# Patient Record
Sex: FEMALE | Race: WHITE
[De-identification: names, ages, dates, MRNs, and addresses within clinical notes are randomized per-mention and may not be internally consistent; named-entity substitution may affect disease eponyms.]

---

## 2018-03-27 ENCOUNTER — RECORD ABSTRACTING (OUTPATIENT)
Age: 48
End: 2018-03-27

## 2018-03-27 DIAGNOSIS — F15.90 OTHER STIMULANT USE, UNSPECIFIED, UNCOMPLICATED: ICD-10-CM

## 2018-03-27 DIAGNOSIS — Z78.9 OTHER SPECIFIED HEALTH STATUS: ICD-10-CM

## 2018-03-27 PROBLEM — Z00.00 ENCOUNTER FOR PREVENTIVE HEALTH EXAMINATION: Status: ACTIVE | Noted: 2018-03-27

## 2018-03-27 RX ORDER — LEVONORGESTREL AND ETHINYL ESTRADIOL 6-5-10
KIT ORAL
Refills: 0 | Status: ACTIVE | COMMUNITY

## 2018-03-30 ENCOUNTER — APPOINTMENT (OUTPATIENT)
Dept: HEART AND VASCULAR | Facility: CLINIC | Age: 48
End: 2018-03-30
Payer: COMMERCIAL

## 2018-03-30 VITALS
HEART RATE: 64 BPM | HEIGHT: 63 IN | WEIGHT: 124 LBS | BODY MASS INDEX: 21.97 KG/M2 | RESPIRATION RATE: 16 BRPM | SYSTOLIC BLOOD PRESSURE: 120 MMHG | DIASTOLIC BLOOD PRESSURE: 80 MMHG

## 2018-03-30 DIAGNOSIS — R07.9 CHEST PAIN, UNSPECIFIED: ICD-10-CM

## 2018-03-30 DIAGNOSIS — Z87.898 PERSONAL HISTORY OF OTHER SPECIFIED CONDITIONS: ICD-10-CM

## 2018-03-30 DIAGNOSIS — I49.8 OTHER SPECIFIED CARDIAC ARRHYTHMIAS: ICD-10-CM

## 2018-03-30 PROCEDURE — 99204 OFFICE O/P NEW MOD 45 MIN: CPT

## 2021-01-26 ENCOUNTER — LABORATORY RESULT (OUTPATIENT)
Age: 51
End: 2021-01-26

## 2021-01-26 ENCOUNTER — APPOINTMENT (OUTPATIENT)
Dept: HEART AND VASCULAR | Facility: CLINIC | Age: 51
End: 2021-01-26
Payer: COMMERCIAL

## 2021-01-26 ENCOUNTER — NON-APPOINTMENT (OUTPATIENT)
Age: 51
End: 2021-01-26

## 2021-01-26 VITALS
OXYGEN SATURATION: 99 % | WEIGHT: 141 LBS | SYSTOLIC BLOOD PRESSURE: 118 MMHG | TEMPERATURE: 97.2 F | RESPIRATION RATE: 16 BRPM | HEART RATE: 56 BPM | HEIGHT: 63 IN | DIASTOLIC BLOOD PRESSURE: 68 MMHG | BODY MASS INDEX: 24.98 KG/M2

## 2021-01-26 PROCEDURE — 93246 EXT ECG>7D<15D RECORDING: CPT

## 2021-01-26 PROCEDURE — 93248 EXT ECG>7D<15D REV&INTERPJ: CPT

## 2021-01-26 PROCEDURE — 99072 ADDL SUPL MATRL&STAF TM PHE: CPT

## 2021-01-26 PROCEDURE — 93000 ELECTROCARDIOGRAM COMPLETE: CPT | Mod: 59

## 2021-01-26 PROCEDURE — 36415 COLL VENOUS BLD VENIPUNCTURE: CPT

## 2021-01-26 RX ORDER — CHROMIUM 200 MCG
TABLET ORAL
Refills: 0 | Status: ACTIVE | COMMUNITY

## 2021-01-26 RX ORDER — COLD-HOT PACK
EACH MISCELLANEOUS
Refills: 0 | Status: ACTIVE | COMMUNITY

## 2021-01-27 LAB
ALBUMIN SERPL ELPH-MCNC: 4.4 G/DL
ALP BLD-CCNC: 57 U/L
ALT SERPL-CCNC: 14 U/L
ANION GAP SERPL CALC-SCNC: 13 MMOL/L
AST SERPL-CCNC: 18 U/L
BASOPHILS # BLD AUTO: 0.08 K/UL
BASOPHILS NFR BLD AUTO: 1.6 %
BILIRUB SERPL-MCNC: 0.3 MG/DL
BUN SERPL-MCNC: 15 MG/DL
CALCIUM SERPL-MCNC: 9.3 MG/DL
CHLORIDE SERPL-SCNC: 106 MMOL/L
CHOLEST SERPL-MCNC: 193 MG/DL
CO2 SERPL-SCNC: 21 MMOL/L
CREAT SERPL-MCNC: 0.66 MG/DL
CRP SERPL HS-MCNC: 3.2 MG/L
EOSINOPHIL # BLD AUTO: 0.1 K/UL
EOSINOPHIL NFR BLD AUTO: 1.9 %
GLUCOSE SERPL-MCNC: 83 MG/DL
HCT VFR BLD CALC: 41.4 %
HDLC SERPL-MCNC: 67 MG/DL
HGB BLD-MCNC: 13 G/DL
IMM GRANULOCYTES NFR BLD AUTO: 0.2 %
LDLC SERPL CALC-MCNC: 102 MG/DL
LYMPHOCYTES # BLD AUTO: 1.55 K/UL
LYMPHOCYTES NFR BLD AUTO: 30.2 %
MAGNESIUM SERPL-MCNC: 2.2 MG/DL
MAN DIFF?: NORMAL
MCHC RBC-ENTMCNC: 31.4 GM/DL
MCHC RBC-ENTMCNC: 32.4 PG
MCV RBC AUTO: 103.2 FL
MONOCYTES # BLD AUTO: 0.31 K/UL
MONOCYTES NFR BLD AUTO: 6 %
NEUTROPHILS # BLD AUTO: 3.09 K/UL
NEUTROPHILS NFR BLD AUTO: 60.1 %
NONHDLC SERPL-MCNC: 126 MG/DL
PHOSPHATE SERPL-MCNC: 2.6 MG/DL
PLATELET # BLD AUTO: 236 K/UL
POTASSIUM SERPL-SCNC: 4.5 MMOL/L
PROT SERPL-MCNC: 6.7 G/DL
RBC # BLD: 4.01 M/UL
RBC # FLD: 13 %
SODIUM SERPL-SCNC: 140 MMOL/L
T3FREE SERPL-MCNC: 2.68 PG/ML
T3RU NFR SERPL: 1.3 TBI
T4 SERPL-MCNC: 8.6 UG/DL
TRIGL SERPL-MCNC: 115 MG/DL
TSH SERPL-ACNC: 2.69 UIU/ML
WBC # FLD AUTO: 5.14 K/UL

## 2021-01-28 ENCOUNTER — NON-APPOINTMENT (OUTPATIENT)
Age: 51
End: 2021-01-28

## 2021-01-28 ENCOUNTER — APPOINTMENT (OUTPATIENT)
Dept: HEART AND VASCULAR | Facility: CLINIC | Age: 51
End: 2021-01-28
Payer: COMMERCIAL

## 2021-01-28 PROCEDURE — 99072 ADDL SUPL MATRL&STAF TM PHE: CPT

## 2021-01-28 PROCEDURE — 93306 TTE W/DOPPLER COMPLETE: CPT

## 2021-02-11 ENCOUNTER — NON-APPOINTMENT (OUTPATIENT)
Age: 51
End: 2021-02-11

## 2021-02-18 ENCOUNTER — APPOINTMENT (OUTPATIENT)
Dept: HEART AND VASCULAR | Facility: CLINIC | Age: 51
End: 2021-02-18
Payer: COMMERCIAL

## 2021-02-18 ENCOUNTER — NON-APPOINTMENT (OUTPATIENT)
Age: 51
End: 2021-02-18

## 2021-02-18 DIAGNOSIS — I49.1 ATRIAL PREMATURE DEPOLARIZATION: ICD-10-CM

## 2021-02-18 DIAGNOSIS — I49.3 VENTRICULAR PREMATURE DEPOLARIZATION: ICD-10-CM

## 2021-02-18 DIAGNOSIS — R94.39 ABNORMAL RESULT OF OTHER CARDIOVASCULAR FUNCTION STUDY: ICD-10-CM

## 2021-02-18 DIAGNOSIS — R00.2 PALPITATIONS: ICD-10-CM

## 2021-02-18 DIAGNOSIS — I47.1 SUPRAVENTRICULAR TACHYCARDIA: ICD-10-CM

## 2021-02-18 PROCEDURE — 99215 OFFICE O/P EST HI 40 MIN: CPT | Mod: 95

## 2021-02-18 NOTE — HISTORY OF PRESENT ILLNESS
[FreeTextEntry1] : Laura Townsend requests a televideo follow up.  Consent obtained and recorded.  She is at her home and doctor is in [place], [place].  \par \par She was seen by me in March 2018.\par \par In late January 2021, she reports experiencing palpitations intermittently throughout the day.  The symptoms occurred primarily at rest, lasting a few seconds at a time, described as "thumping" or "racy" in the chest.  She denies any associated cp, sob, lightheadedness.  She denies pnd, orthopnea, edema, or loc. She notes no change in her exercise capacity and endurance.\par \par She is active and compliant with supplements that she has been taking for some time.\par \par ECG 1/2021: sinus bradycardia, short NC\par TTE 1/2021: nl lv sys fxn; nl michelle fxn\par ZIO 1/2021: APC/PVC/SVT - correlates with her symptoms\par Blood 1/2021: slightly elevated CRP\par \par EXSE 3/2018: nl lv sys fxn; nl michelle fxn; 12:49 min [place]; pos ECG; no ischemia by WM\par \par Reviewed clinical hx and results in detail.  \par \par PE from 3/2018 eval.\par \par Recommendations:\par 1. reassurance provided - explained the palps she has been having as likely to be related to APC/PVC/SVT - benign.  \par 2. inc po water intake - symptoms have improved significantly\par 3. exercise\par 4. EXSE\par 5. consider EP eval and/or beta blocker is symptoms persist \par 6. counseled to follow up if character of palp symptoms changes or become more persistent\par 7. will forward results etc to primary care\par

## 2021-02-18 NOTE — DISCUSSION/SUMMARY
[ECG Normal Variant] : ECG normal variant [Possible Cardiac Ischemia (Intermd Prob)] : possible cardiac ischemia (intermediate probability) [Dietary Modification] : dietary modification [Exercise Regimen] : an exercise regimen [PAT] : paroxysmal atrial tachycardia [PVCs] : ectopic ventricular beats [SVT] : paroxysmal supraventricular tachycardia [Stable] : stable [None] : There are no changes in medication management [Patient] : the patient [de-identified] : APC's

## 2021-02-18 NOTE — PHYSICAL EXAM
[General Appearance - Well Developed] : well developed [Normal Appearance] : normal appearance [Well Groomed] : well groomed [General Appearance - Well Nourished] : well nourished [No Deformities] : no deformities [General Appearance - In No Acute Distress] : no acute distress [Normal Conjunctiva] : the conjunctiva exhibited no abnormalities [Eyelids - No Xanthelasma] : the eyelids demonstrated no xanthelasmas [Normal Oral Mucosa] : normal oral mucosa [No Oral Pallor] : no oral pallor [No Oral Cyanosis] : no oral cyanosis [Normal Jugular Venous A Waves Present] : normal jugular venous A waves present [Normal Jugular Venous V Waves Present] : normal jugular venous V waves present [No Jugular Venous Boateng A Waves] : no jugular venous boateng A waves [Respiration, Rhythm And Depth] : normal respiratory rhythm and effort [Exaggerated Use Of Accessory Muscles For Inspiration] : no accessory muscle use [Auscultation Breath Sounds / Voice Sounds] : lungs were clear to auscultation bilaterally [Heart Rate And Rhythm] : heart rate and rhythm were normal [Heart Sounds] : normal S1 and S2 [Murmurs] : no murmurs present [Abdomen Soft] : soft [Abdomen Tenderness] : non-tender [Abdomen Mass (___ Cm)] : no abdominal mass palpated [Abnormal Walk] : normal gait [Gait - Sufficient For Exercise Testing] : the gait was sufficient for exercise testing [Nail Clubbing] : no clubbing of the fingernails [Cyanosis, Localized] : no localized cyanosis [Petechial Hemorrhages (___cm)] : no petechial hemorrhages [Skin Color & Pigmentation] : normal skin color and pigmentation [] : no rash [No Venous Stasis] : no venous stasis [Skin Lesions] : no skin lesions [No Skin Ulcers] : no skin ulcer [No Xanthoma] : no  xanthoma was observed

## 2021-04-06 ENCOUNTER — APPOINTMENT (OUTPATIENT)
Dept: HEART AND VASCULAR | Facility: CLINIC | Age: 51
End: 2021-04-06